# Patient Record
Sex: FEMALE | URBAN - METROPOLITAN AREA
[De-identification: names, ages, dates, MRNs, and addresses within clinical notes are randomized per-mention and may not be internally consistent; named-entity substitution may affect disease eponyms.]

---

## 2024-03-08 ENCOUNTER — OV EP (OUTPATIENT)
Dept: URBAN - METROPOLITAN AREA CLINIC 105 | Facility: CLINIC | Age: 64
End: 2024-03-08
Payer: COMMERCIAL

## 2024-03-08 VITALS
DIASTOLIC BLOOD PRESSURE: 77 MMHG | HEIGHT: 63 IN | BODY MASS INDEX: 29.45 KG/M2 | WEIGHT: 166.2 LBS | SYSTOLIC BLOOD PRESSURE: 156 MMHG | HEART RATE: 80 BPM

## 2024-03-08 DIAGNOSIS — Z86.010 HX OF COLONIC POLYPS: ICD-10-CM

## 2024-03-08 DIAGNOSIS — K62.89 PROCTALGIA: ICD-10-CM

## 2024-03-08 DIAGNOSIS — D12.6 TUBULAR ADENOMA OF COLON: ICD-10-CM

## 2024-03-08 DIAGNOSIS — K57.90 DIVERTICULOSIS: ICD-10-CM

## 2024-03-08 PROBLEM — 397881000: Status: ACTIVE | Noted: 2024-03-08

## 2024-03-08 PROBLEM — 428283002: Status: ACTIVE | Noted: 2024-03-08

## 2024-03-08 PROCEDURE — 99213 OFFICE O/P EST LOW 20 MIN: CPT | Performed by: INTERNAL MEDICINE

## 2024-03-08 RX ORDER — LISINOPRIL 20 MG/1
1 TABLET TABLET ORAL ONCE A DAY
Status: ACTIVE | COMMUNITY

## 2024-03-08 NOTE — HPI-TODAY'S VISIT:
09/15/2023 The patient presents for a diagnostic colonoscopy. There is no family history of colon polyps or colon cancer. Patient denies change in bowel habits, appetite, and weight. Denies abdominal pain. Patient denies rectal bleeding. Patient denies any cardiac, lung, or kidney problems. Patient denies any digestive symptoms currently. Last colonoscopy: 7-8 years ago that was WNL. STates it was in [place] and she does not remember the doctor. BMs are regular. 1 BM a day. Had hx of constipation. Could go 3-4 days without a BM. Denies rectal bleeding. Denies straining or incomplete evacuation. Did not take anything in the past.  Also c/o sharp pain in rectum that happens every few months that has been an issue for the past 20 years. Not related to BMs. Can last 1-2 minutes. Does not radiate. Denies diarrhea, abdominal cramping and family hx of IBD.    03/08/2024 here s/p colonoscopy. Results, pathology and recommendations discussed. Patient had procedure with another provider due to insurance. She has no additional complaints.